# Patient Record
Sex: MALE | Race: WHITE | NOT HISPANIC OR LATINO | Employment: OTHER | URBAN - METROPOLITAN AREA
[De-identification: names, ages, dates, MRNs, and addresses within clinical notes are randomized per-mention and may not be internally consistent; named-entity substitution may affect disease eponyms.]

---

## 2022-11-18 ENCOUNTER — OFFICE VISIT (OUTPATIENT)
Dept: PULMONOLOGY | Facility: CLINIC | Age: 67
End: 2022-11-18

## 2022-11-18 VITALS
OXYGEN SATURATION: 98 % | HEART RATE: 84 BPM | TEMPERATURE: 97.6 F | SYSTOLIC BLOOD PRESSURE: 142 MMHG | DIASTOLIC BLOOD PRESSURE: 90 MMHG | WEIGHT: 218 LBS

## 2022-11-18 DIAGNOSIS — Z87.09 HISTORY OF ACUTE RESPIRATORY FAILURE: ICD-10-CM

## 2022-11-18 DIAGNOSIS — K21.9 CHRONIC GERD: ICD-10-CM

## 2022-11-18 DIAGNOSIS — Z85.89 HISTORY OF HEAD AND NECK CANCER: ICD-10-CM

## 2022-11-18 DIAGNOSIS — Z00.6 EXAMINATION FOR NORMAL COMPARISON OR CONTROL IN CLINICAL RESEARCH: Primary | ICD-10-CM

## 2022-11-18 DIAGNOSIS — J18.9 RECURRENT PNEUMONIA: Primary | ICD-10-CM

## 2022-11-18 PROCEDURE — 99204 OFFICE O/P NEW MOD 45 MIN: CPT | Performed by: INTERNAL MEDICINE

## 2022-11-18 RX ORDER — THEOPHYLLINE 300 MG/1
150 TABLET, EXTENDED RELEASE ORAL EVERY 12 HOURS
COMMUNITY
Start: 2022-11-09

## 2022-11-18 RX ORDER — ATENOLOL 50 MG/1
TABLET ORAL
COMMUNITY
Start: 2022-10-12

## 2022-11-18 RX ORDER — ALPRAZOLAM 0.5 MG/1
0.5 TABLET ORAL 2 TIMES DAILY
COMMUNITY
Start: 2022-11-03

## 2022-11-18 RX ORDER — FLUTICASONE FUROATE, UMECLIDINIUM BROMIDE AND VILANTEROL TRIFENATATE 100; 62.5; 25 UG/1; UG/1; UG/1
1 POWDER RESPIRATORY (INHALATION)
COMMUNITY

## 2022-11-18 RX ORDER — LEVOTHYROXINE SODIUM 0.03 MG/1
TABLET ORAL
COMMUNITY
Start: 2022-11-09

## 2022-11-18 RX ORDER — ALBUTEROL SULFATE 90 UG/1
AEROSOL, METERED RESPIRATORY (INHALATION)
COMMUNITY
Start: 2022-11-09

## 2022-11-18 RX ORDER — ESOMEPRAZOLE MAGNESIUM 40 MG/1
40 CAPSULE, DELAYED RELEASE ORAL DAILY
COMMUNITY
Start: 2022-11-03

## 2022-11-18 RX ORDER — TIZANIDINE 4 MG/1
TABLET ORAL
COMMUNITY
Start: 2022-09-20

## 2022-11-18 RX ORDER — HYDROCODONE BITARTRATE AND ACETAMINOPHEN 10; 325 MG/1; MG/1
1 TABLET ORAL EVERY 8 HOURS PRN
COMMUNITY
Start: 2022-11-03

## 2022-11-18 RX ORDER — LOSARTAN POTASSIUM 100 MG/1
100 TABLET ORAL DAILY
COMMUNITY
Start: 2022-11-09

## 2022-11-18 RX ORDER — FAMOTIDINE 20 MG/1
TABLET, FILM COATED ORAL
COMMUNITY
Start: 2022-11-03 | End: 2022-11-18

## 2022-11-19 NOTE — PROGRESS NOTES
"  PULMONARY  NOTE    Chief Complaint     Recurrent pneumonia, history of respiratory failure requiring vent, history of head neck cancer, chronic GERD    History of Present Illness     67-year-old male referred for evaluation of recurrent pneumonia    The patient indicates that he has had no significant smoking history    He has a history of head neck cancer diagnosed at [facility]  I am not able to find any records in the epic link to [facility], however  He previously saw Dr. Caldera and received radiation therapy and chemotherapy  He apparently has had no evidence of recurrence to date    He has had recurrent episodes of pneumonia  He recollects having 12 or more episodes of pneumonia over the last year or so  Typically these episodes come on acutely with little in the way of preceding symptoms  Frequently he will go to bed at night feeling fine and 3 or 4 hours later wake up in respiratory distress  He has had migratory infiltrates involving both lungs  He has had 1 episode where he required intubation and vent support for short period of time  He has had multiple bronchoscopies by his local pulmonary physician during these episodes  I have some hospital records and I only see negative culture results  The patient indicates that typically nothing cultures out from the specimens that have been obtained    He has chronic reflux symptoms  At times he will wake up at night with vomit in his mouth  He just recently started using a wedge pillow and tries to avoid eating or drinking before going to bed at night  He has not had an episode since he has been following those precautions    He has had a dysphagia evaluation in the last several months which was apparently \"okay\"  This was done during one of his hospital stays    Patient Active Problem List   Diagnosis   • Recurrent pneumonia (Probably reflux with aspiration)   • Chronic GERD   • History of head and neck cancer (S/P XRT & Chemo at [facility]) - [facility]ANTHA   • History of acute " respiratory failure requiring vent x 1     No Known Allergies    Current Outpatient Medications:   •  albuterol sulfate  (90 Base) MCG/ACT inhaler, inhale TWO puffs BY MOUTH EVERY 6 HOURS AS NEEDED, Disp: , Rfl:   •  ALPRAZolam (XANAX) 0.5 MG tablet, Take 0.5 mg by mouth 2 (Two) Times a Day., Disp: , Rfl:   •  aspirin 325 MG EC tablet, Take 325 mg by mouth Daily., Disp: , Rfl:   •  atenolol (TENORMIN) 50 MG tablet, TABLET ONE-HALF TABLET BY MOUTH DAILY, Disp: , Rfl:   •  esomeprazole (nexIUM) 40 MG capsule, Take 40 mg by mouth Daily., Disp: , Rfl:   •  Fluticasone-Umeclidin-Vilant (Trelegy Ellipta) 100-62.5-25 MCG/ACT inhaler, Inhale 1 puff Daily., Disp: , Rfl:   •  HYDROcodone-acetaminophen (NORCO)  MG per tablet, Take 1 tablet by mouth Every 8 (Eight) Hours As Needed., Disp: , Rfl:   •  levothyroxine (SYNTHROID, LEVOTHROID) 25 MCG tablet, TAKE ONE TABLET EVERY MORNING ON EMPTY stomach, Disp: , Rfl:   •  losartan (COZAAR) 100 MG tablet, Take 100 mg by mouth Daily., Disp: , Rfl:   •  theophylline (THEODUR) 300 MG 12 hr tablet, Take 150 mg by mouth Every 12 (Twelve) Hours., Disp: , Rfl:   •  tiZANidine (ZANAFLEX) 4 MG tablet, TAKE ONE TABLET THREE TIMES DAILY AS NEEDED, Disp: , Rfl:   MEDICATION LIST AND ALLERGIES REVIEWED.    No family history on file.  Social History     Tobacco Use   • Smoking status: Former     Packs/day: 2.00     Years: 35.00     Pack years: 70.00     Types: Cigarettes     Quit date:      Years since quittin.8   • Smokeless tobacco: Former   Substance Use Topics   • Alcohol use: Never   • Drug use: Never     Social History     Social History Narrative   • Not on file     FAMILY AND SOCIAL HISTORY REVIEWED.    Review of Systems  ALSO REFER TO SCANNED ROS SHEET FROM SAME DATE.    /90   Pulse 84   Temp 97.6 °F (36.4 °C)   Wt 98.9 kg (218 lb)   SpO2 98% Comment: resting, room air  Physical Exam  Vitals and nursing note reviewed.   Constitutional:       General: He is  "not in acute distress.     Appearance: He is well-developed. He is not diaphoretic.   HENT:      Head: Normocephalic and atraumatic.   Neck:      Thyroid: No thyromegaly.   Cardiovascular:      Rate and Rhythm: Normal rate and regular rhythm.      Heart sounds: Normal heart sounds. No murmur heard.  Pulmonary:      Effort: Pulmonary effort is normal.      Breath sounds: Normal breath sounds. No stridor.   Lymphadenopathy:      Cervical: No cervical adenopathy.      Upper Body:      Right upper body: No supraclavicular or epitrochlear adenopathy.      Left upper body: No supraclavicular or epitrochlear adenopathy.   Skin:     General: Skin is warm and dry.   Neurological:      Mental Status: He is alert and oriented to person, place, and time.   Psychiatric:         Behavior: Behavior normal.         Results     Immunization History   Administered Date(s) Administered   • COVID-19 (MODERNA) 1st, 2nd, 3rd Dose Only 02/24/2021, 03/24/2021, 08/24/2021, 05/18/2022   • Fluad Quad 65+ 10/11/2022   • Influenza, Unspecified 10/10/2016, 11/09/2017, 10/01/2018, 11/04/2019, 11/04/2020, 10/19/2022   • Pneumococcal Polysaccharide (PPSV23) 08/13/2020   • Shingrix 06/28/2021   • TD Preservative Free 03/23/2009   • Tdap 12/25/2021     Problem List       ICD-10-CM ICD-9-CM   1. Recurrent pneumonia (Probably reflux with aspiration)  J18.9 486   2. Chronic GERD  K21.9 530.81   3. History of acute respiratory failure requiring vent x 1  Z87.09 V12.69   4. History of head and neck cancer (S/P XRT & Chemo at [facility]) - SAMANTHA  Z85.89 V10.89       Discussion     His history is highly consistent with reflux, aspiration, and recurrent pneumonia  Less likely to be oral pharyngeal dysphagia and aspiration given the timing of his events and recent dysphagia evaluation which was apparently \"okay\"    A hypersensitivity pneumonitis could be a consideration as well but what the patient is telling me today sounds highly consistent with reflux and " aspiration    I have recommended strict reflux precautions  This includes absolutely nothing by mouth at least 2 to 3 hours before going to bed at night  Also, elevation of the head of his bed  He may benefit from further GI evaluation including esophageal manometry  Surgical intervention may need to be considered    At this point I would not recommend any further pulmonary work-up other than what has already been pursued by his local physician    I will just see him back on an as-needed basis.    Moderate level of Medical Decision Making complexity based on 1 undiagnosed new problem, independent interpretation of tests, and/or prescription drug management     Clyde Charles MD  Note electronically signed    CC: Provider, No Known